# Patient Record
Sex: FEMALE | URBAN - METROPOLITAN AREA
[De-identification: names, ages, dates, MRNs, and addresses within clinical notes are randomized per-mention and may not be internally consistent; named-entity substitution may affect disease eponyms.]

---

## 2021-10-07 ENCOUNTER — APPOINTMENT (OUTPATIENT)
Dept: URBAN - METROPOLITAN AREA CLINIC 270 | Age: 41
Setting detail: DERMATOLOGY
End: 2021-10-07

## 2021-10-07 DIAGNOSIS — F42.4 EXCORIATION (SKIN-PICKING) DISORDER: ICD-10-CM

## 2021-10-07 DIAGNOSIS — L24 IRRITANT CONTACT DERMATITIS: ICD-10-CM

## 2021-10-07 PROBLEM — L24.3 IRRITANT CONTACT DERMATITIS DUE TO COSMETICS: Status: ACTIVE | Noted: 2021-10-07

## 2021-10-07 PROBLEM — L98.1 FACTITIAL DERMATITIS: Status: ACTIVE | Noted: 2021-10-07

## 2021-10-07 PROCEDURE — OTHER PRESCRIPTION: OTHER

## 2021-10-07 PROCEDURE — OTHER COUNSELING: OTHER

## 2021-10-07 PROCEDURE — 99203 OFFICE O/P NEW LOW 30 MIN: CPT

## 2021-10-07 PROCEDURE — OTHER GENTLE SKIN CARE INSTRUCTIONS: OTHER

## 2021-10-07 RX ORDER — CETIRIZINE HYDROCHLORIDE 10 MG/1
TABLET, FILM COATED ORAL
Qty: 60 | Refills: 2 | Status: ERX | COMMUNITY
Start: 2021-10-07

## 2021-10-07 RX ORDER — MOMETASONE FUROATE 1 MG/G
CREAM TOPICAL
Qty: 45 | Refills: 1 | Status: ERX | COMMUNITY
Start: 2021-10-07

## 2021-10-07 ASSESSMENT — LOCATION DETAILED DESCRIPTION DERM
LOCATION DETAILED: LEFT INFERIOR MEDIAL MALAR CHEEK
LOCATION DETAILED: LEFT MEDIAL MALAR CHEEK
LOCATION DETAILED: RIGHT CENTRAL MALAR CHEEK

## 2021-10-07 ASSESSMENT — LOCATION SIMPLE DESCRIPTION DERM
LOCATION SIMPLE: RIGHT CHEEK
LOCATION SIMPLE: LEFT CHEEK
LOCATION SIMPLE: LEFT CHEEK

## 2021-10-07 ASSESSMENT — LOCATION ZONE DERM
LOCATION ZONE: FACE
LOCATION ZONE: FACE

## 2021-11-09 ENCOUNTER — APPOINTMENT (OUTPATIENT)
Dept: URBAN - METROPOLITAN AREA CLINIC 270 | Age: 41
Setting detail: DERMATOLOGY
End: 2021-11-11

## 2021-11-09 DIAGNOSIS — F42.4 EXCORIATION (SKIN-PICKING) DISORDER: ICD-10-CM

## 2021-11-09 DIAGNOSIS — L24 IRRITANT CONTACT DERMATITIS: ICD-10-CM

## 2021-11-09 PROBLEM — L98.1 FACTITIAL DERMATITIS: Status: ACTIVE | Noted: 2021-11-09

## 2021-11-09 PROBLEM — L24.3 IRRITANT CONTACT DERMATITIS DUE TO COSMETICS: Status: ACTIVE | Noted: 2021-11-09

## 2021-11-09 PROCEDURE — OTHER ADDITIONAL NOTES: OTHER

## 2021-11-09 PROCEDURE — OTHER COUNSELING: TOPICAL STEROIDS: OTHER

## 2021-11-09 PROCEDURE — OTHER GENTLE SKIN CARE INSTRUCTIONS: OTHER

## 2021-11-09 PROCEDURE — OTHER COUNSELING: OTHER

## 2021-11-09 PROCEDURE — 99213 OFFICE O/P EST LOW 20 MIN: CPT

## 2021-11-09 ASSESSMENT — LOCATION DETAILED DESCRIPTION DERM
LOCATION DETAILED: RIGHT SUPERIOR CENTRAL MALAR CHEEK
LOCATION DETAILED: RIGHT CENTRAL MALAR CHEEK
LOCATION DETAILED: LEFT MEDIAL MALAR CHEEK
LOCATION DETAILED: LEFT MEDIAL MALAR CHEEK
LOCATION DETAILED: LEFT INFERIOR MEDIAL MALAR CHEEK

## 2021-11-09 ASSESSMENT — LOCATION SIMPLE DESCRIPTION DERM
LOCATION SIMPLE: RIGHT CHEEK
LOCATION SIMPLE: RIGHT CHEEK
LOCATION SIMPLE: LEFT CHEEK
LOCATION SIMPLE: LEFT CHEEK

## 2021-11-09 ASSESSMENT — SEVERITY ASSESSMENT 2020: SEVERITY 2020: CLEAR

## 2021-11-09 ASSESSMENT — LOCATION ZONE DERM
LOCATION ZONE: FACE
LOCATION ZONE: FACE

## 2021-11-09 NOTE — PROCEDURE: ADDITIONAL NOTES
Additional Notes: Allergy testing was done. Pt will start getting injections.
Detail Level: Simple
Render Risk Assessment In Note?: yes